# Patient Record
Sex: MALE | Race: WHITE | NOT HISPANIC OR LATINO | Employment: UNEMPLOYED | URBAN - NONMETROPOLITAN AREA
[De-identification: names, ages, dates, MRNs, and addresses within clinical notes are randomized per-mention and may not be internally consistent; named-entity substitution may affect disease eponyms.]

---

## 2017-02-18 ENCOUNTER — APPOINTMENT (OUTPATIENT)
Dept: GENERAL RADIOLOGY | Facility: HOSPITAL | Age: 27
End: 2017-02-18

## 2017-02-18 ENCOUNTER — HOSPITAL ENCOUNTER (EMERGENCY)
Facility: HOSPITAL | Age: 27
Discharge: HOME | End: 2017-02-18
Attending: EMERGENCY MEDICINE | Admitting: EMERGENCY MEDICINE

## 2017-02-18 VITALS
TEMPERATURE: 99.3 F | DIASTOLIC BLOOD PRESSURE: 102 MMHG | SYSTOLIC BLOOD PRESSURE: 144 MMHG | OXYGEN SATURATION: 97 % | HEIGHT: 68 IN | HEART RATE: 67 BPM | BODY MASS INDEX: 32.58 KG/M2 | RESPIRATION RATE: 20 BRPM | WEIGHT: 215 LBS

## 2017-02-18 DIAGNOSIS — M25.511 ARTHRALGIA OF SHOULDER REGION, RIGHT: ICD-10-CM

## 2017-02-18 DIAGNOSIS — M25.511 ACUTE PAIN OF RIGHT SHOULDER: Primary | ICD-10-CM

## 2017-02-18 PROCEDURE — 25010000002 KETOROLAC TROMETHAMINE PER 15 MG: Performed by: PHYSICIAN ASSISTANT

## 2017-02-18 PROCEDURE — 73030 X-RAY EXAM OF SHOULDER: CPT

## 2017-02-18 PROCEDURE — 99284 EMERGENCY DEPT VISIT MOD MDM: CPT

## 2017-02-18 PROCEDURE — 96372 THER/PROPH/DIAG INJ SC/IM: CPT

## 2017-02-18 PROCEDURE — 25010000002 ORPHENADRINE CITRATE PER 60 MG: Performed by: PHYSICIAN ASSISTANT

## 2017-02-18 RX ORDER — KETOROLAC TROMETHAMINE 30 MG/ML
30 INJECTION, SOLUTION INTRAMUSCULAR; INTRAVENOUS ONCE
Status: COMPLETED | OUTPATIENT
Start: 2017-02-18 | End: 2017-02-18

## 2017-02-18 RX ORDER — PROMETHAZINE HYDROCHLORIDE 25 MG/1
25 TABLET ORAL EVERY 6 HOURS PRN
Qty: 20 TABLET | Refills: 0 | Status: SHIPPED | OUTPATIENT
Start: 2017-02-18 | End: 2018-06-11

## 2017-02-18 RX ORDER — ORPHENADRINE CITRATE 100 MG/1
100 TABLET, EXTENDED RELEASE ORAL 2 TIMES DAILY
Qty: 14 TABLET | Refills: 0 | Status: SHIPPED | OUTPATIENT
Start: 2017-02-18 | End: 2018-06-11

## 2017-02-18 RX ORDER — NAPROXEN 500 MG/1
500 TABLET ORAL 2 TIMES DAILY PRN
Qty: 14 TABLET | Refills: 0 | Status: SHIPPED | OUTPATIENT
Start: 2017-02-18 | End: 2018-06-11

## 2017-02-18 RX ORDER — ONDANSETRON 4 MG/1
4 TABLET, FILM COATED ORAL ONCE
Status: DISCONTINUED | OUTPATIENT
Start: 2017-02-18 | End: 2017-02-19 | Stop reason: HOSPADM

## 2017-02-18 RX ORDER — ONDANSETRON 4 MG/1
4 TABLET, ORALLY DISINTEGRATING ORAL ONCE
Status: COMPLETED | OUTPATIENT
Start: 2017-02-18 | End: 2017-02-18

## 2017-02-18 RX ORDER — ORPHENADRINE CITRATE 30 MG/ML
60 INJECTION INTRAMUSCULAR; INTRAVENOUS ONCE
Status: COMPLETED | OUTPATIENT
Start: 2017-02-18 | End: 2017-02-18

## 2017-02-18 RX ADMIN — KETOROLAC TROMETHAMINE 30 MG: 30 INJECTION, SOLUTION INTRAMUSCULAR at 21:59

## 2017-02-18 RX ADMIN — ORPHENADRINE CITRATE 60 MG: 30 INJECTION INTRAMUSCULAR; INTRAVENOUS at 21:58

## 2017-02-18 RX ADMIN — ONDANSETRON 4 MG: 4 TABLET, ORALLY DISINTEGRATING ORAL at 23:13

## 2017-02-19 NOTE — DISCHARGE INSTRUCTIONS
Apply warm compresses every 3-4 hours for 20-30 minutes.  Follow-up with  on Monday for recheck.  Return if any change or worsening.

## 2017-02-19 NOTE — ED PROVIDER NOTES
Subjective   Patient is a 26 y.o. male presenting with upper extremity pain.   History provided by:  Patient  Upper Extremity Issue   Location:  Shoulder  Shoulder location:  R shoulder  Injury: yes (Overuse injury at work)    Time since incident:  2 days  Mechanism of injury comment:  Overuse injury  Pain details:     Quality:  Aching    Radiates to:  Does not radiate    Severity:  Severe    Onset quality:  Gradual    Duration:  2 days    Timing:  Constant  Handedness:  Right-handed  Dislocation: no    Foreign body present:  No foreign bodies  Tetanus status:  Unknown  Prior injury to area:  No  Relieved by:  Nothing  Worsened by:  Nothing  Ineffective treatments:  None tried  Associated symptoms: decreased range of motion and stiffness    Associated symptoms: no back pain, no fatigue, no fever, no muscle weakness, no neck pain, no numbness, no swelling and no tingling      26-year-old male presents complaining of increased right shoulder pain near acromioclavicular aspect superiorly for the past 2 days, has been worsening, unable to tolerate pain with OTC medications at home.  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  He denies paresis or paresthesia.  He has increased pain with abduction.  No shortness of breath.  No neck pain.  No direct trauma.  No prior history of shoulder injury.  No prior history of cervical disc disease.  Review of Systems   Constitutional: Negative for fatigue and fever.   Musculoskeletal: Positive for stiffness. Negative for back pain and neck pain.        Per history of present illness   All other systems reviewed and are negative.      Past Medical History   Diagnosis Date   • Injury of back        No Known Allergies    Past Surgical History   Procedure Laterality Date   • Back surgery     • Appendectomy     • Abdominal surgery     • Colon surgery         History reviewed. No pertinent family history.    Social History     Social History    • Marital status: Single     Spouse name: N/A   • Number of children: N/A   • Years of education: N/A     Social History Main Topics   • Smoking status: Former Smoker   • Smokeless tobacco: None   • Alcohol use No   • Drug use: None   • Sexual activity: Not Asked     Other Topics Concern   • None     Social History Narrative   • None           Objective   Physical Exam   Constitutional: He is oriented to person, place, and time. He appears well-developed and well-nourished. No distress.   HENT:   Head: Normocephalic and atraumatic.   Right Ear: External ear normal.   Left Ear: External ear normal.   Nose: Nose normal.   Mouth/Throat: Oropharynx is clear and moist. No oropharyngeal exudate.   Eyes: Conjunctivae and EOM are normal. Pupils are equal, round, and reactive to light. Right eye exhibits no discharge. Left eye exhibits no discharge. No scleral icterus.   Neck: Normal range of motion. Neck supple. No JVD present. No tracheal deviation present. No thyromegaly present.   Cardiovascular: Normal rate, regular rhythm, normal heart sounds and intact distal pulses.  Exam reveals no gallop and no friction rub.    No murmur heard.  Pulmonary/Chest: Effort normal and breath sounds normal. No stridor. No respiratory distress. He has no wheezes. He has no rales. He exhibits no tenderness.   Abdominal: Soft. He exhibits no distension.   Musculoskeletal: Normal range of motion. He exhibits tenderness. He exhibits no edema or deformity.   Right shoulder without obvious deformity no crepitus or step-off.  There is tenderness over the superior aspect of the shoulder and acromioclavicular joint, reproducing patient's symptoms. Able  to perform open-door maneuver with elbow helld at 90° flexion close into body.  He does have increased pain with abduction at about 70°.  Unable to perform empty cup test due to pain.  Pulses intact and symmetric neurovascular status intact distally.   Lymphadenopathy:     He has no cervical  "adenopathy.   Neurological: He is alert and oriented to person, place, and time. No cranial nerve deficit. He exhibits normal muscle tone. Coordination normal.   Skin: Skin is warm and dry. No rash noted. He is not diaphoretic. No erythema. No pallor.   Psychiatric: He has a normal mood and affect. His behavior is normal. Judgment and thought content normal.   Nursing note and vitals reviewed.      Procedures        No results found for this or any previous visit (from the past 24 hour(s)).  Note: In addition to lab results from this visit, the labs listed above may include labs taken at another facility or during a different encounter within the last 24 hours. Please correlate lab times with ED admission and discharge times for further clarification of the services performed during this visit.    XR Shoulder 2+ View Right   ED Interpretation   No acute findings        Vitals:    02/18/17 2035 02/18/17 2156 02/18/17 2348   BP: 141/95 (!) 140/102 (!) 144/102   BP Location:   Left arm   Patient Position:   Sitting   Pulse: 81 70 67   Resp: 18 20 20   Temp: 98.9 °F (37.2 °C)  99.3 °F (37.4 °C)   TempSrc: Oral  Oral   SpO2: 98% 97% 97%   Weight: 215 lb (97.5 kg)     Height: 68\" (172.7 cm)       Medications   ondansetron (ZOFRAN) tablet 4 mg (4 mg Oral Not Given 2/18/17 2357)   orphenadrine (NORFLEX) injection 60 mg (60 mg Intramuscular Given 2/18/17 2158)   ketorolac (TORADOL) injection 30 mg (30 mg Intramuscular Given 2/18/17 2159)   ondansetron ODT (ZOFRAN-ODT) disintegrating tablet 4 mg (4 mg Oral Given 2/18/17 2313)     ECG/EMG Results (last 24 hours)     ** No results found for the last 24 hours. **          ED Course  ED Course                  MDM    Final diagnoses:   Acute pain of right shoulder   Arthralgia of shoulder region, right            Carlos Venu Feliz PA-C  02/19/17 0121    "

## 2017-02-23 ENCOUNTER — OFFICE VISIT (OUTPATIENT)
Dept: FAMILY MEDICINE CLINIC | Facility: CLINIC | Age: 27
End: 2017-02-23

## 2017-02-23 VITALS
OXYGEN SATURATION: 100 % | WEIGHT: 204 LBS | RESPIRATION RATE: 16 BRPM | TEMPERATURE: 98.4 F | BODY MASS INDEX: 30.92 KG/M2 | SYSTOLIC BLOOD PRESSURE: 125 MMHG | HEART RATE: 73 BPM | DIASTOLIC BLOOD PRESSURE: 90 MMHG | HEIGHT: 68 IN

## 2017-02-23 DIAGNOSIS — IMO0001 TEAR OF RIGHT SUPRASPINATUS TENDON, INITIAL ENCOUNTER: Primary | ICD-10-CM

## 2017-02-23 PROCEDURE — 99214 OFFICE O/P EST MOD 30 MIN: CPT | Performed by: INTERNAL MEDICINE

## 2017-02-23 PROCEDURE — 96372 THER/PROPH/DIAG INJ SC/IM: CPT | Performed by: INTERNAL MEDICINE

## 2017-02-23 RX ORDER — METHYLPREDNISOLONE ACETATE 80 MG/ML
80 INJECTION, SUSPENSION INTRA-ARTICULAR; INTRALESIONAL; INTRAMUSCULAR; SOFT TISSUE ONCE
Status: COMPLETED | OUTPATIENT
Start: 2017-02-23 | End: 2017-02-23

## 2017-02-23 RX ORDER — MELOXICAM 15 MG/1
15 TABLET ORAL DAILY
Qty: 30 TABLET | Refills: 1 | Status: SHIPPED | OUTPATIENT
Start: 2017-02-23 | End: 2018-06-11

## 2017-02-23 RX ADMIN — METHYLPREDNISOLONE ACETATE 80 MG: 80 INJECTION, SUSPENSION INTRA-ARTICULAR; INTRALESIONAL; INTRAMUSCULAR; SOFT TISSUE at 12:41

## 2017-02-23 NOTE — PROGRESS NOTES
"Subjective   Patient ID: Ha Henson is a 26 y.o. male Pt is here for management of multiple medical problems.  History of Present Illness  Pt is here for management of multiple medical problems.    Pain in right shoulder.   1 week of pain. Seen in er. Told to wear sling and take ibu.  Not wearing sling. Taking naproxen 5oo mg BId.   Changes tires occ.  No know issues.  Just started to hurt at home and got worse.    Having chills and sweats.  Nausa.  Mostly pain.      The following portions of the patient's history were reviewed and updated as appropriate: allergies, current medications, past family history, past medical history, past social history, past surgical history and problem list.      Review of Systems   Constitutional: Positive for fatigue.   Musculoskeletal: Positive for arthralgias.   All other systems reviewed and are negative.      Objective     Visit Vitals   • /90   • Pulse 73   • Temp 98.4 °F (36.9 °C) (Oral)   • Resp 16   • Ht 68\" (172.7 cm)   • Wt 204 lb (92.5 kg)   • SpO2 100%   • BMI 31.02 kg/m2     Physical Exam  General Appearance:    Alert, cooperative, no distress, appears stated age   Head:    Normocephalic, without obvious abnormality, atraumatic   Eyes:    PERRL, conjunctiva/corneas clear, EOM's intact           Ears:    Normal TM's and external ear canals, both ears   Nose:   Nares normal, septum midline, mucosa normal, no drainage    or sinus tenderness   Throat:   Lips, mucosa, and tongue normal; teeth and gums normal   Neck:   Supple, symmetrical, trachea midline, no adenopathy;        thyroid:  No enlargement/tenderness/nodules; no carotid    bruit or JVD   Back:     Symmetric, no curvature, ROM normal, no CVA tenderness   Lungs:     Clear to auscultation bilaterally, respirations unlabored   Chest wall:    No tenderness or deformity   Heart:    Regular rate and rhythm, S1 and S2 normal, no murmur, rub   or gallop   Abdomen:     Soft, non-tender, bowel sounds active " all four quadrants,     no masses, no organomegaly           Extremities:  weakness and pain with thumb down abduction. + apprehension.  Most ttp interior shoulder.      Pulses:   2+ and symmetric all extremities   Skin:   Skin color, texture, turgor normal, no rashes or lesions   Lymph nodes:   Cervical, supraclavicular, and axillary nodes normal   Neurologic:   CNII-XII intact. Normal strength, sensation and reflexes       throughout       Assessment/Plan     Use sling day and night.  Steroids. Nsaids. Monitor bp.        Ha was seen today for establish care and pain.    Diagnoses and all orders for this visit:    Tear of right supraspinatus tendon, initial encounter  -     MRI Shoulder Right Without Contrast; Future  -     methylPREDNISolone acetate (DEPO-medrol) injection 80 mg; Inject 1 mL into the shoulder, thigh, or buttocks 1 (One) Time.  -     meloxicam (MOBIC) 15 MG tablet; Take 1 tablet by mouth Daily.    Return in about 1 week (around 3/2/2017).                   There are no Patient Instructions on file for this visit.

## 2018-05-30 ENCOUNTER — APPOINTMENT (OUTPATIENT)
Dept: GENERAL RADIOLOGY | Facility: HOSPITAL | Age: 28
End: 2018-05-30

## 2018-05-30 ENCOUNTER — HOSPITAL ENCOUNTER (EMERGENCY)
Facility: HOSPITAL | Age: 28
Discharge: HOME | End: 2018-05-30
Attending: EMERGENCY MEDICINE | Admitting: EMERGENCY MEDICINE

## 2018-05-30 VITALS
HEART RATE: 118 BPM | TEMPERATURE: 98.2 F | BODY MASS INDEX: 29.33 KG/M2 | DIASTOLIC BLOOD PRESSURE: 90 MMHG | WEIGHT: 198 LBS | RESPIRATION RATE: 20 BRPM | SYSTOLIC BLOOD PRESSURE: 137 MMHG | HEIGHT: 69 IN | OXYGEN SATURATION: 99 %

## 2018-05-30 DIAGNOSIS — S90.31XA CONTUSION OF RIGHT FOOT, INITIAL ENCOUNTER: Primary | ICD-10-CM

## 2018-05-30 DIAGNOSIS — S97.81XA CRUSHING INJURY OF RIGHT FOOT, INITIAL ENCOUNTER: ICD-10-CM

## 2018-05-30 PROCEDURE — 73630 X-RAY EXAM OF FOOT: CPT

## 2018-05-30 PROCEDURE — 99283 EMERGENCY DEPT VISIT LOW MDM: CPT

## 2018-05-30 RX ORDER — IBUPROFEN 800 MG/1
800 TABLET ORAL EVERY 8 HOURS PRN
Qty: 30 TABLET | Refills: 0 | Status: SHIPPED | OUTPATIENT
Start: 2018-05-30

## 2018-05-30 RX ORDER — HYDROCODONE BITARTRATE AND ACETAMINOPHEN 5; 325 MG/1; MG/1
1 TABLET ORAL ONCE
Status: COMPLETED | OUTPATIENT
Start: 2018-05-30 | End: 2018-05-30

## 2018-05-30 RX ADMIN — HYDROCODONE BITARTRATE AND ACETAMINOPHEN 1 TABLET: 5; 325 TABLET ORAL at 12:58

## 2018-06-02 ENCOUNTER — HOSPITAL ENCOUNTER (EMERGENCY)
Facility: HOSPITAL | Age: 28
Discharge: HOME | End: 2018-06-02
Attending: EMERGENCY MEDICINE | Admitting: EMERGENCY MEDICINE

## 2018-06-02 VITALS
OXYGEN SATURATION: 98 % | DIASTOLIC BLOOD PRESSURE: 93 MMHG | WEIGHT: 198 LBS | SYSTOLIC BLOOD PRESSURE: 131 MMHG | RESPIRATION RATE: 20 BRPM | HEIGHT: 68 IN | HEART RATE: 92 BPM | TEMPERATURE: 98.3 F | BODY MASS INDEX: 30.01 KG/M2

## 2018-06-02 DIAGNOSIS — M54.31 SCIATICA OF RIGHT SIDE: ICD-10-CM

## 2018-06-02 DIAGNOSIS — S39.012A STRAIN OF LUMBAR REGION, INITIAL ENCOUNTER: Primary | ICD-10-CM

## 2018-06-02 PROCEDURE — 99283 EMERGENCY DEPT VISIT LOW MDM: CPT

## 2018-06-02 PROCEDURE — 96372 THER/PROPH/DIAG INJ SC/IM: CPT

## 2018-06-02 PROCEDURE — 25010000002 KETOROLAC TROMETHAMINE PER 15 MG: Performed by: EMERGENCY MEDICINE

## 2018-06-02 RX ORDER — CYCLOBENZAPRINE HCL 5 MG
5 TABLET ORAL 3 TIMES DAILY PRN
Qty: 20 TABLET | Refills: 0 | Status: SHIPPED | OUTPATIENT
Start: 2018-06-02 | End: 2018-06-11

## 2018-06-02 RX ORDER — NAPROXEN 500 MG/1
500 TABLET ORAL 2 TIMES DAILY PRN
Qty: 20 TABLET | Refills: 0 | Status: SHIPPED | OUTPATIENT
Start: 2018-06-02 | End: 2018-06-11

## 2018-06-02 RX ORDER — HYDROCODONE BITARTRATE AND ACETAMINOPHEN 5; 325 MG/1; MG/1
1 TABLET ORAL ONCE
Status: COMPLETED | OUTPATIENT
Start: 2018-06-02 | End: 2018-06-02

## 2018-06-02 RX ORDER — PREDNISONE 20 MG/1
20 TABLET ORAL 3 TIMES DAILY
Qty: 15 TABLET | Refills: 0 | Status: SHIPPED | OUTPATIENT
Start: 2018-06-02 | End: 2018-06-11

## 2018-06-02 RX ORDER — KETOROLAC TROMETHAMINE 15 MG/ML
15 INJECTION, SOLUTION INTRAMUSCULAR; INTRAVENOUS ONCE
Status: COMPLETED | OUTPATIENT
Start: 2018-06-02 | End: 2018-06-02

## 2018-06-02 RX ADMIN — HYDROCODONE BITARTRATE AND ACETAMINOPHEN 1 TABLET: 5; 325 TABLET ORAL at 13:23

## 2018-06-02 RX ADMIN — KETOROLAC TROMETHAMINE 15 MG: 15 INJECTION, SOLUTION INTRAMUSCULAR; INTRAVENOUS at 13:26

## 2018-06-02 NOTE — ED PROVIDER NOTES
Subjective   28-year-old male presents complaining of right-sided low back pain.  He states that last night, he lifted a heavy tote and when he stood up he felt a pop in his low back.  Since that time, he has been experiencing pain and tightness in his right lumbar paraspinal region.  He endorses occasional shooting pains in his right buttocks and right thigh.  No bowel or bladder incontinence or retention.  No IV drug use.  No fevers or systemic symptoms.  The patient is ambulatory.  Pain is currently 8 out of 10 in severity and worse with movement, twisting, and walking.        Back Pain   Location:  Lumbar spine (Right)  Radiates to:  Does not radiate  Pain severity:  Moderate  Onset quality:  Sudden  Duration:  12 hours  Timing:  Constant  Progression:  Unchanged  Chronicity:  New  Context: lifting heavy objects (felt pop)    Relieved by:  Nothing  Ineffective treatments: Acetamenophin.  Associated symptoms: no bladder incontinence and no bowel incontinence        Review of Systems   Gastrointestinal: Negative for bowel incontinence.   Genitourinary: Negative for bladder incontinence.   Musculoskeletal: Positive for back pain.   All other systems reviewed and are negative.      Past Medical History:   Diagnosis Date   • Injury of back        No Known Allergies    Past Surgical History:   Procedure Laterality Date   • ABDOMINAL SURGERY     • APPENDECTOMY     • BACK SURGERY     • COLON SURGERY     • WISDOM TOOTH EXTRACTION         Family History   Problem Relation Age of Onset   • Family history unknown: Yes       Social History     Social History   • Marital status: Single     Social History Main Topics   • Smoking status: Never Smoker   • Smokeless tobacco: Never Used   • Alcohol use No   • Drug use: No     Other Topics Concern   • Not on file         Objective   Physical Exam   Constitutional: He is oriented to person, place, and time. He appears well-developed and well-nourished. No distress.   Well-appearing  male in no acute distress   HENT:   Head: Normocephalic and atraumatic.   Mouth/Throat: Oropharynx is clear and moist.   Cardiovascular: Normal rate, regular rhythm and normal heart sounds.  Exam reveals no gallop and no friction rub.    No murmur heard.  Pulmonary/Chest: Effort normal and breath sounds normal. No respiratory distress. He has no wheezes. He has no rales.   Musculoskeletal: Normal range of motion.   Mild tenderness noted to right lumbar paraspinal region, no midline tenderness, no step-offs or deformities present   Neurological: He is alert and oriented to person, place, and time. He displays normal reflexes. No sensory deficit. He exhibits normal muscle tone.   5 out of 5 strength in bilateral lower extremities, neurovascularly intact distally in bilateral extremities with bounding distal pulses and normal sensation, no saddle anesthesia, normal gait   Skin: Skin is warm and dry. No rash noted. He is not diaphoretic. No erythema. No pallor.   Psychiatric: He has a normal mood and affect. Judgment and thought content normal.   Nursing note and vitals reviewed.      Procedures         ED Course  ED Course as of Jun 02 1320   Sat Jun 02, 2018   1319 28-year-old male presents complaining of atraumatic right-sided low back pain since last night after heavy lifting.  On arrival to the ED, patient very well-appearing with benign exam.  No red flag low back pain signs, symptoms, or history necessitating any imaging at this time.  Reassured and counseled regarding symptomatic treatment.  Pain control provided in the ED.  Scripts for NSAIDs, Flexeril, and steroids.  He will follow-up with primary care physician within one week and was referred to neurosurgery if he fails conservative treatment.  Agreeable with plan and given appropriate return precautions.  [DD]      ED Course User Index  [DD] Rene Johnson MD               No results found for this or any previous visit (from the past 24  "hour(s)).  Note: In addition to lab results from this visit, the labs listed above may include labs taken at another facility or during a different encounter within the last 24 hours. Please correlate lab times with ED admission and discharge times for further clarification of the services performed during this visit.    No orders to display     Vitals:    06/02/18 1308   BP: 148/95   BP Location: Left arm   Patient Position: Sitting   Pulse: 92   Resp: 20   Temp: 98.3 °F (36.8 °C)   TempSrc: Oral   SpO2: 98%   Weight: 89.8 kg (198 lb)   Height: 172.7 cm (68\")     Medications   ketorolac (TORADOL) injection 15 mg (not administered)   HYDROcodone-acetaminophen (NORCO) 5-325 MG per tablet 1 tablet (not administered)     ECG/EMG Results (last 24 hours)     ** No results found for the last 24 hours. **            MDM    Final diagnoses:   Strain of lumbar region, initial encounter   Sciatica of right side       Documentation assistance provided by karin Alston.  Information recorded by the scribe was done at my direction and has been verified and validated by me.     Willis Alston  06/02/18 1316       Rene Johnson MD  06/02/18 1320       Rene Johnson MD  06/02/18 1323    "

## 2018-06-02 NOTE — DISCHARGE INSTRUCTIONS
Follow up with your primary care provider, Dr. Velasco, in 1 week.    Follow up with Dr. Macdonald, neurosurgeon, as needed.    Return to the ED if you experience any new or worsening symptoms.

## 2018-06-11 ENCOUNTER — OFFICE VISIT (OUTPATIENT)
Dept: INTERNAL MEDICINE | Facility: CLINIC | Age: 28
End: 2018-06-11

## 2018-06-11 ENCOUNTER — HOSPITAL ENCOUNTER (OUTPATIENT)
Dept: GENERAL RADIOLOGY | Facility: HOSPITAL | Age: 28
Discharge: HOME | End: 2018-06-11
Admitting: INTERNAL MEDICINE

## 2018-06-11 VITALS
DIASTOLIC BLOOD PRESSURE: 92 MMHG | HEIGHT: 68 IN | SYSTOLIC BLOOD PRESSURE: 141 MMHG | WEIGHT: 193 LBS | RESPIRATION RATE: 16 BRPM | HEART RATE: 102 BPM | OXYGEN SATURATION: 100 % | TEMPERATURE: 98.3 F | BODY MASS INDEX: 29.25 KG/M2

## 2018-06-11 DIAGNOSIS — I10 ESSENTIAL HYPERTENSION: ICD-10-CM

## 2018-06-11 DIAGNOSIS — R00.0 TACHYCARDIA: ICD-10-CM

## 2018-06-11 DIAGNOSIS — M54.16 LUMBAR BACK PAIN WITH RADICULOPATHY AFFECTING LEFT LOWER EXTREMITY: Primary | ICD-10-CM

## 2018-06-11 DIAGNOSIS — M54.16 LUMBAR BACK PAIN WITH RADICULOPATHY AFFECTING LEFT LOWER EXTREMITY: ICD-10-CM

## 2018-06-11 LAB
ALBUMIN SERPL-MCNC: 4.2 G/DL (ref 3.5–5)
ALBUMIN/GLOB SERPL: 1.3 G/DL (ref 1–2)
ALP SERPL-CCNC: 59 U/L (ref 38–126)
ALT SERPL-CCNC: 48 U/L (ref 13–69)
AST SERPL-CCNC: 36 U/L (ref 15–46)
BASOPHILS # BLD AUTO: 0.05 10*3/MM3 (ref 0–0.2)
BASOPHILS NFR BLD AUTO: 0.5 % (ref 0–2.5)
BILIRUB BLD-MCNC: NEGATIVE MG/DL
BILIRUB SERPL-MCNC: 0.1 MG/DL (ref 0.2–1.3)
BUN SERPL-MCNC: 17 MG/DL (ref 7–20)
BUN/CREAT SERPL: 18.9 (ref 6.3–21.9)
CALCIUM SERPL-MCNC: 9.7 MG/DL (ref 8.4–10.2)
CHLORIDE SERPL-SCNC: 104 MMOL/L (ref 98–107)
CLARITY, POC: CLEAR
CO2 SERPL-SCNC: 27 MMOL/L (ref 26–30)
COLOR UR: YELLOW
CREAT SERPL-MCNC: 0.9 MG/DL (ref 0.6–1.3)
EOSINOPHIL # BLD AUTO: 0.42 10*3/MM3 (ref 0–0.7)
EOSINOPHIL NFR BLD AUTO: 4.4 % (ref 0–7)
ERYTHROCYTE [DISTWIDTH] IN BLOOD BY AUTOMATED COUNT: 16.1 % (ref 11.5–14.5)
GFR SERPLBLD CREATININE-BSD FMLA CKD-EPI: 100 ML/MIN/1.73
GFR SERPLBLD CREATININE-BSD FMLA CKD-EPI: 121 ML/MIN/1.73
GLOBULIN SER CALC-MCNC: 3.2 GM/DL
GLUCOSE SERPL-MCNC: 92 MG/DL (ref 74–98)
GLUCOSE UR STRIP-MCNC: NEGATIVE MG/DL
HCT VFR BLD AUTO: 42.1 % (ref 42–52)
HGB BLD-MCNC: 13.4 G/DL (ref 14–18)
IMM GRANULOCYTES # BLD: 0.08 10*3/MM3 (ref 0–0.06)
IMM GRANULOCYTES NFR BLD: 0.8 % (ref 0–0.6)
KETONES UR QL: NEGATIVE
LEUKOCYTE EST, POC: NEGATIVE
LYMPHOCYTES # BLD AUTO: 3.1 10*3/MM3 (ref 0.6–3.4)
LYMPHOCYTES NFR BLD AUTO: 32.6 % (ref 10–50)
MCH RBC QN AUTO: 27.6 PG (ref 27–31)
MCHC RBC AUTO-ENTMCNC: 31.8 G/DL (ref 30–37)
MCV RBC AUTO: 86.8 FL (ref 80–94)
MONOCYTES # BLD AUTO: 0.77 10*3/MM3 (ref 0–0.9)
MONOCYTES NFR BLD AUTO: 8.1 % (ref 0–12)
NEUTROPHILS # BLD AUTO: 5.09 10*3/MM3 (ref 2–6.9)
NEUTROPHILS NFR BLD AUTO: 53.6 % (ref 37–80)
NITRITE UR-MCNC: NEGATIVE MG/ML
NRBC BLD AUTO-RTO: 0 /100 WBC (ref 0–0)
PH UR: 6 [PH] (ref 5–8)
PLATELET # BLD AUTO: 399 10*3/MM3 (ref 130–400)
POTASSIUM SERPL-SCNC: 5.1 MMOL/L (ref 3.5–5.1)
PROT SERPL-MCNC: 7.4 G/DL (ref 6.3–8.2)
PROT UR STRIP-MCNC: NEGATIVE MG/DL
RBC # BLD AUTO: 4.85 10*6/MM3 (ref 4.7–6.1)
RBC # UR STRIP: NEGATIVE /UL
SODIUM SERPL-SCNC: 140 MMOL/L (ref 137–145)
SP GR UR: 1.01 (ref 1–1.03)
T4 FREE SERPL-MCNC: 0.93 NG/DL (ref 0.78–2.19)
TSH SERPL DL<=0.005 MIU/L-ACNC: 2.89 MIU/ML (ref 0.47–4.68)
UROBILINOGEN UR QL: NORMAL
WBC # BLD AUTO: 9.51 10*3/MM3 (ref 4.8–10.8)

## 2018-06-11 PROCEDURE — 72110 X-RAY EXAM L-2 SPINE 4/>VWS: CPT

## 2018-06-11 PROCEDURE — 99214 OFFICE O/P EST MOD 30 MIN: CPT | Performed by: INTERNAL MEDICINE

## 2018-06-11 RX ORDER — ACETAMINOPHEN 500 MG
500 TABLET ORAL EVERY 6 HOURS PRN
COMMUNITY
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
## 2018-06-12 NOTE — PROGRESS NOTES
Please let them know the labs look good.  Osteophyte in lumbar spine. May need mri ifnot better with Dr Kumar.

## 2018-06-14 ENCOUNTER — HOSPITAL ENCOUNTER (OUTPATIENT)
Dept: MRI IMAGING | Facility: HOSPITAL | Age: 28
Discharge: HOME | End: 2018-06-14
Attending: INTERNAL MEDICINE | Admitting: INTERNAL MEDICINE

## 2018-06-14 DIAGNOSIS — M54.16 LUMBAR BACK PAIN WITH RADICULOPATHY AFFECTING LEFT LOWER EXTREMITY: ICD-10-CM

## 2018-06-14 PROCEDURE — 72148 MRI LUMBAR SPINE W/O DYE: CPT

## 2018-06-15 ENCOUNTER — TELEPHONE (OUTPATIENT)
Dept: INTERNAL MEDICINE | Facility: CLINIC | Age: 28
End: 2018-06-15

## 2018-06-18 ENCOUNTER — HOSPITAL ENCOUNTER (EMERGENCY)
Facility: HOSPITAL | Age: 28
Discharge: HOME | End: 2018-06-18
Attending: EMERGENCY MEDICINE | Admitting: EMERGENCY MEDICINE

## 2018-06-18 ENCOUNTER — OFFICE VISIT (OUTPATIENT)
Dept: INTERNAL MEDICINE | Facility: CLINIC | Age: 28
End: 2018-06-18

## 2018-06-18 VITALS
HEART RATE: 86 BPM | TEMPERATURE: 98.3 F | DIASTOLIC BLOOD PRESSURE: 89 MMHG | SYSTOLIC BLOOD PRESSURE: 148 MMHG | HEIGHT: 68 IN | RESPIRATION RATE: 16 BRPM | OXYGEN SATURATION: 100 % | WEIGHT: 188 LBS | BODY MASS INDEX: 28.49 KG/M2

## 2018-06-18 VITALS
WEIGHT: 188 LBS | SYSTOLIC BLOOD PRESSURE: 141 MMHG | TEMPERATURE: 98.7 F | BODY MASS INDEX: 26.92 KG/M2 | DIASTOLIC BLOOD PRESSURE: 89 MMHG | HEART RATE: 91 BPM | RESPIRATION RATE: 18 BRPM | HEIGHT: 70 IN | OXYGEN SATURATION: 100 %

## 2018-06-18 DIAGNOSIS — M54.50 LOW BACK PAIN RADIATING TO LEFT LOWER EXTREMITY: Primary | ICD-10-CM

## 2018-06-18 DIAGNOSIS — M51.36 BULGING LUMBAR DISC: Primary | ICD-10-CM

## 2018-06-18 DIAGNOSIS — M54.42 ACUTE LEFT-SIDED LOW BACK PAIN WITH LEFT-SIDED SCIATICA: ICD-10-CM

## 2018-06-18 DIAGNOSIS — M51.36 ANNULAR TEAR OF LUMBAR DISC: ICD-10-CM

## 2018-06-18 DIAGNOSIS — M79.605 LOW BACK PAIN RADIATING TO LEFT LOWER EXTREMITY: Primary | ICD-10-CM

## 2018-06-18 PROCEDURE — 99283 EMERGENCY DEPT VISIT LOW MDM: CPT

## 2018-06-18 PROCEDURE — 25010000002 MORPHINE PER 10 MG: Performed by: EMERGENCY MEDICINE

## 2018-06-18 PROCEDURE — 96375 TX/PRO/DX INJ NEW DRUG ADDON: CPT

## 2018-06-18 PROCEDURE — 25010000002 METHYLPREDNISOLONE PER 125 MG: Performed by: EMERGENCY MEDICINE

## 2018-06-18 PROCEDURE — 25010000002 KETOROLAC TROMETHAMINE PER 15 MG: Performed by: EMERGENCY MEDICINE

## 2018-06-18 PROCEDURE — 99213 OFFICE O/P EST LOW 20 MIN: CPT | Performed by: INTERNAL MEDICINE

## 2018-06-18 PROCEDURE — 96374 THER/PROPH/DIAG INJ IV PUSH: CPT

## 2018-06-18 RX ORDER — SODIUM CHLORIDE 0.9 % (FLUSH) 0.9 %
10 SYRINGE (ML) INJECTION AS NEEDED
Status: DISCONTINUED | OUTPATIENT
Start: 2018-06-18 | End: 2018-06-18 | Stop reason: HOSPADM

## 2018-06-18 RX ORDER — MORPHINE SULFATE 4 MG/ML
4 INJECTION, SOLUTION INTRAMUSCULAR; INTRAVENOUS ONCE
Status: COMPLETED | OUTPATIENT
Start: 2018-06-18 | End: 2018-06-18

## 2018-06-18 RX ORDER — METHYLPREDNISOLONE 4 MG/1
TABLET ORAL
Qty: 21 EACH | Refills: 0 | Status: SHIPPED | OUTPATIENT
Start: 2018-06-18

## 2018-06-18 RX ORDER — METHYLPREDNISOLONE SODIUM SUCCINATE 125 MG/2ML
125 INJECTION, POWDER, LYOPHILIZED, FOR SOLUTION INTRAMUSCULAR; INTRAVENOUS ONCE
Status: COMPLETED | OUTPATIENT
Start: 2018-06-18 | End: 2018-06-18

## 2018-06-18 RX ORDER — CYCLOBENZAPRINE HCL 10 MG
10 TABLET ORAL 3 TIMES DAILY PRN
Qty: 30 TABLET | Refills: 3 | Status: SHIPPED | OUTPATIENT
Start: 2018-06-18

## 2018-06-18 RX ORDER — KETOROLAC TROMETHAMINE 15 MG/ML
15 INJECTION, SOLUTION INTRAMUSCULAR; INTRAVENOUS ONCE
Status: COMPLETED | OUTPATIENT
Start: 2018-06-18 | End: 2018-06-18

## 2018-06-18 RX ORDER — TRAMADOL HYDROCHLORIDE 50 MG/1
50 TABLET ORAL EVERY 6 HOURS PRN
Qty: 12 TABLET | Refills: 0 | Status: SHIPPED | OUTPATIENT
Start: 2018-06-18

## 2018-06-18 RX ADMIN — METHYLPREDNISOLONE SODIUM SUCCINATE 125 MG: 125 INJECTION, POWDER, FOR SOLUTION INTRAMUSCULAR; INTRAVENOUS at 19:18

## 2018-06-18 RX ADMIN — MORPHINE SULFATE 4 MG: 4 INJECTION, SOLUTION INTRAMUSCULAR; INTRAVENOUS at 19:20

## 2018-06-18 RX ADMIN — KETOROLAC TROMETHAMINE 15 MG: 15 INJECTION, SOLUTION INTRAMUSCULAR; INTRAVENOUS at 19:16

## 2018-06-18 NOTE — ED PROVIDER NOTES
Subjective   Mr. Ha Henson is a 28 y.o. male who presents to the ED with c/o back pain. Per old records, pt visted PCP this morning for f/u regarding chronic back pain. During visit he was instructed to continue pain management with Flexeril and Medrol and to follow up in 3 months. Dr. Blankenship also recommended consult with Dr. Tineo and pt is in process of scheduling this appointment. Pt presents to ED now for intractable lumbar back pain. He has numbness and pain radiating down left leg. He denies bowel or bladder dysfunction. Flexeril and Medrol have provided no relief and pt states the pain is too severe to wait for next f/u with Dr. Blankenship. He has no other acute sx at this time. Pt has hx of chronic back pain for years gradually worsening and ADHD.           History provided by:  Patient  Back Pain   Location:  Lumbar spine  Radiates to:  L thigh  Pain severity:  Moderate  Pain is:  Same all the time  Timing:  Constant  Progression:  Unchanged  Chronicity:  Chronic  Relieved by:  Nothing  Worsened by:  Ambulation and sitting  Ineffective treatments: Flexeril and Medrol.  Associated symptoms: leg pain, numbness (left leg ) and tingling (Left leg )    Associated symptoms: no abdominal pain, no bladder incontinence, no bowel incontinence, no chest pain, no dysuria, no fever, no perianal numbness and no weakness        Review of Systems   Constitutional: Negative for fever.   Cardiovascular: Negative for chest pain.   Gastrointestinal: Negative for abdominal pain and bowel incontinence.   Genitourinary: Negative for bladder incontinence and dysuria.   Musculoskeletal: Positive for back pain (lumbar back).   Neurological: Positive for tingling (Left leg ) and numbness (left leg ). Negative for weakness.   All other systems reviewed and are negative.      Past Medical History:   Diagnosis Date   • ADHD    • Chronic back pain    • Injury of back        No Known Allergies    Past Surgical History:   Procedure  Laterality Date   • ABDOMINAL SURGERY     • APPENDECTOMY     • COLON SURGERY     • HIP FRACTURE SURGERY Left    • WISDOM TOOTH EXTRACTION         Family History   Problem Relation Age of Onset   • Family history unknown: Yes       Social History     Social History   • Marital status: Single     Social History Main Topics   • Smoking status: Never Smoker   • Smokeless tobacco: Never Used   • Alcohol use No   • Drug use: No   • Sexual activity: Defer     Other Topics Concern   • Not on file         Objective   Physical Exam   Constitutional: He is oriented to person, place, and time. He appears well-developed and well-nourished. No distress.   HENT:   Head: Normocephalic and atraumatic.   Right Ear: External ear normal.   Left Ear: External ear normal.   Nose: Nose normal.   Eyes: Conjunctivae are normal. No scleral icterus.   Neck: Normal range of motion. Neck supple.   Pulmonary/Chest: Effort normal. No respiratory distress.   Musculoskeletal: Normal range of motion. He exhibits no edema.        Lumbar back: He exhibits tenderness (midline and soft tissue tenderness, left greater than right. ).    Positive straight leg raise on the left.    Neurological: He is alert and oriented to person, place, and time.   Reflex Scores:       Patellar reflexes are 2+ on the right side and 2+ on the left side.  5/5 strength bilaterally in BLE with flexion and extension.    Skin: Skin is warm and dry.   Psychiatric: He has a normal mood and affect. His behavior is normal.   Nursing note and vitals reviewed.      Procedures         ED Course      Reviewed old records.   MRI LUMBAR SPINE     INDICATION: Low back pain and left lower extremity numbness. Patient  denied recent injury.     FINDINGS: Multisequence, multiplanar MR imaging of the lumbar spine  without contrast.     No compression deformity. No subluxation. No marrow edema or marrow  replacement. Conus has a normal appearance at level of termination.  There is disc  desiccation at L4-5 and L5-S1 with relative preservation  of disc space height.     Axial images were obtained through the following levels:     T12-L1: No canal or foraminal narrowing.     L1-2: No canal or foraminal narrowing.     L2-3: No canal or foraminal narrowing.     L3-4: No canal or foraminal narrowing.     L4-5: Circumferential disc bulge. Central broad-based disc protrusion  with associated central annular disc tear. Findings result in only  minimal central canal narrowing. No foraminal narrowing.     L5-S1: Circumferential disc bulge. There is also a broad-based left  paracentral and lateral disc protrusion with associated annular disc  tear. These findings result in minimal central canal narrowing  asymmetric towards the left towards the left lateral recess, approaching  the left S1 nerve but without definite posterior displacement. No  significant foraminal narrowing.     IMPRESSION:  Lower lumbar disc protrusions and annular disc tears as  detailed above.       ANDREW query complete. Treatment plan to include limited course of prescribed  controlled substance. Risks including addiction, benefits, and alternatives presented to patient.     Pt feeling better after treatment. He is already on Flexeril/Steroids and taking NSAIDs. Will Rx short course of Ultram for pain. He plans to f/u with Dr. Tineo with Neurosurgery.        No results found for this or any previous visit (from the past 24 hour(s)).  Note: In addition to lab results from this visit, the labs listed above may include labs taken at another facility or during a different encounter within the last 24 hours. Please correlate lab times with ED admission and discharge times for further clarification of the services performed during this visit.    No orders to display     Vitals:    06/18/18 1749   BP: 141/89   BP Location: Left arm   Patient Position: Sitting   Pulse: 91   Resp: 18   Temp: 98.7 °F (37.1 °C)   TempSrc: Oral   SpO2: 100%  "  Weight: 85.3 kg (188 lb)   Height: 177.8 cm (70\")     Medications   sodium chloride 0.9 % flush 10 mL (not administered)   methylPREDNISolone sodium succinate (SOLU-Medrol) injection 125 mg (125 mg Intravenous Given 6/18/18 1918)   ketorolac (TORADOL) injection 15 mg (15 mg Intravenous Given 6/18/18 1916)   Morphine sulfate (PF) injection 4 mg (4 mg Intravenous Given 6/18/18 1920)                          MDM    Final diagnoses:   Bulging lumbar disc   Annular tear of lumbar disc   Acute left-sided low back pain with left-sided sciatica       Documentation assistance provided by karin Pal.  Information recorded by the scribe was done at my direction and has been verified and validated by me.     Ayden Pal  06/18/18 1929       SISSY Hamm  06/18/18 5224    "

## 2018-06-18 NOTE — PROGRESS NOTES
"Subjective     Patient ID: Ha Henson is a 28 y.o. male. Patient is here for management of multiple medical problems.     Chief Complaint   Patient presents with   • Back Pain     follow-up, MRI results, patient having pain in the left leg x 3 days     History of Present Illness     Pt still in sig pain. Chiropractor not helping. Pt may be worse on the left side.    Tried dryer PT and no help.    Left lower back pain with radiculopathy in left leg.    Getting worse.        The following portions of the patient's history were reviewed and updated as appropriate: allergies, current medications, past family history, past medical history, past social history, past surgical history and problem list.    Review of Systems   Constitutional: Positive for fatigue.   Musculoskeletal: Positive for back pain and gait problem. Negative for neck pain.   Psychiatric/Behavioral: Negative for sleep disturbance.   All other systems reviewed and are negative.      Current Outpatient Prescriptions:   •  acetaminophen (TYLENOL) 500 MG tablet, Take 500 mg by mouth Every 6 (Six) Hours As Needed for Mild Pain ., Disp: , Rfl:   •  ibuprofen (ADVIL,MOTRIN) 800 MG tablet, Take 1 tablet by mouth Every 8 (Eight) Hours As Needed for Mild Pain  or Moderate Pain  for up to 30 doses., Disp: 30 tablet, Rfl: 0  •  cyclobenzaprine (FLEXERIL) 10 MG tablet, Take 1 tablet by mouth 3 (Three) Times a Day As Needed for Muscle Spasms., Disp: 30 tablet, Rfl: 3  •  MethylPREDNISolone (MEDROL, JAMIE,) 4 MG tablet, Take as directed on package instructions., Disp: 21 each, Rfl: 0    Objective      Blood pressure 148/89, pulse 86, temperature 98.3 °F (36.8 °C), temperature source Oral, resp. rate 16, height 172.7 cm (68\"), weight 85.3 kg (188 lb), SpO2 100 %.    Physical Exam     General Appearance:    Alert, cooperative, moderate distress, appears stated age   Head:    Normocephalic, without obvious abnormality, atraumatic   Eyes:    PERRL, " conjunctiva/corneas clear, EOM's intact   Ears:    Normal TM's and external ear canals, both ears   Nose:   Nares normal, septum midline, mucosa normal, no drainage   or sinus tenderness   Throat:   Lips, mucosa, and tongue normal; teeth and gums normal   Neck:   Supple, symmetrical, trachea midline, no adenopathy;        thyroid:  No enlargement/tenderness/nodules; no carotid    bruit or JVD   Back:     Symmetric, no curvature, ROM normal, no CVA tenderness   Lungs:     Clear to auscultation bilaterally, respirations unlabored   Chest wall:    No tenderness or deformity   Heart:    Regular rate and rhythm, S1 and S2 normal, no murmur,        rub or gallop   Abdomen:     Soft, non-tender, bowel sounds active all four quadrants,     no masses, no organomegaly   Extremities:   Limited rom.  + slr. + 3 dtr on left.       Pulses:   2+ and symmetric all extremities   Skin:   Skin color, texture, turgor normal, no rashes or lesions   Lymph nodes:   Cervical, supraclavicular, and axillary nodes normal   Neurologic:   CNII-XII intact. Normal strength, sensation and reflexes       throughout      Results for orders placed or performed in visit on 06/11/18   TSH   Result Value Ref Range    TSH 2.890 0.470 - 4.680 mIU/mL   T4, Free   Result Value Ref Range    Free T4 0.93 0.78 - 2.19 ng/dL   Comprehensive Metabolic Panel   Result Value Ref Range    Glucose 92 74 - 98 mg/dL    BUN 17 7 - 20 mg/dL    Creatinine 0.90 0.60 - 1.30 mg/dL    eGFR Non African Am 100 >60 mL/min/1.73    eGFR African Am 121 >60 mL/min/1.73    BUN/Creatinine Ratio 18.9 6.3 - 21.9    Sodium 140 137 - 145 mmol/L    Potassium 5.1 3.5 - 5.1 mmol/L    Chloride 104 98 - 107 mmol/L    Total CO2 27.0 26.0 - 30.0 mmol/L    Calcium 9.7 8.4 - 10.2 mg/dL    Total Protein 7.4 6.3 - 8.2 g/dL    Albumin 4.20 3.50 - 5.00 g/dL    Globulin 3.2 gm/dL    A/G Ratio 1.3 1.0 - 2.0 g/dL    Total Bilirubin 0.1 (L) 0.2 - 1.3 mg/dL    Alkaline Phosphatase 59 38 - 126 U/L    AST  (SGOT) 36 15 - 46 U/L    ALT (SGPT) 48 13 - 69 U/L   POC Urinalysis Dipstick, Automated   Result Value Ref Range    Color Yellow Yellow, Straw, Dark Yellow, Raya    Clarity, UA Clear Clear    Specific Gravity  1.015 1.005 - 1.030    pH, Urine 6.0 5.0 - 8.0    Leukocytes Negative Negative    Nitrite, UA Negative Negative    Protein, POC Negative Negative mg/dL    Glucose, UA Negative Negative, 1000 mg/dL (3+) mg/dL    Ketones, UA Negative Negative    Urobilinogen, UA Normal Normal    Bilirubin Negative Negative    Blood, UA Negative Negative   CBC & Differential   Result Value Ref Range    WBC 9.51 4.80 - 10.80 10*3/mm3    RBC 4.85 4.70 - 6.10 10*6/mm3    Hemoglobin 13.4 (L) 14.0 - 18.0 g/dL    Hematocrit 42.1 42.0 - 52.0 %    MCV 86.8 80.0 - 94.0 fL    MCH 27.6 27.0 - 31.0 pg    MCHC 31.8 30.0 - 37.0 g/dL    RDW 16.1 (H) 11.5 - 14.5 %    Platelets 399 130 - 400 10*3/mm3    Neutrophil Rel % 53.6 37.0 - 80.0 %    Lymphocyte Rel % 32.6 10.0 - 50.0 %    Monocyte Rel % 8.1 0.0 - 12.0 %    Eosinophil Rel % 4.4 0.0 - 7.0 %    Basophil Rel % 0.5 0.0 - 2.5 %    Neutrophils Absolute 5.09 2.00 - 6.90 10*3/mm3    Lymphocytes Absolute 3.10 0.60 - 3.40 10*3/mm3    Monocytes Absolute 0.77 0.00 - 0.90 10*3/mm3    Eosinophils Absolute 0.42 0.00 - 0.70 10*3/mm3    Basophils Absolute 0.05 0.00 - 0.20 10*3/mm3    Immature Granulocyte Rel % 0.8 (H) 0.0 - 0.6 %    Immature Grans Absolute 0.08 (H) 0.00 - 0.06 10*3/mm3    nRBC 0.0 0.0 - 0.0 /100 WBC         Assessment/Plan       Ha was seen today for back pain.    Diagnoses and all orders for this visit:    Low back pain radiating to left lower extremity  -     MethylPREDNISolone (MEDROL, JAMIE,) 4 MG tablet; Take as directed on package instructions.  -     cyclobenzaprine (FLEXERIL) 10 MG tablet; Take 1 tablet by mouth 3 (Three) Times a Day As Needed for Muscle Spasms.  -     Ambulatory Referral to Neurosurgery      Return in about 3 months (around 9/18/2018).          There are no  Patient Instructions on file for this visit.     Fei Blankenship MD    Assessment/Plan

## 2018-06-19 ENCOUNTER — TELEPHONE (OUTPATIENT)
Dept: INTERNAL MEDICINE | Facility: CLINIC | Age: 28
End: 2018-06-19

## 2018-06-19 NOTE — TELEPHONE ENCOUNTER
Dr. Tineo looked at the MRI and said that it wasn't urgent enough to see him so they have referred him out to Dr. Dixon.  Is that ok?  Please return call to pt.

## 2018-06-20 NOTE — TELEPHONE ENCOUNTER
[facility] CALLED ABOUT THE REFERRAL TO NEUROSURGERY. THEY ARE CURRENTLY WAITING FOR DR. NGUYEN'S  TO CALL THEM TO GET HIM SCHEDULE. SHE SAID HE IS STILL IN A LOT OF PAIN AND ARE WONDERING IF THERE IS SOMETHING THAT CAN BE DONE IN THE MEANTIME WHILE THEY WAIT FOR THE APPOINTMENT. SHE WOULD LIKE A CALL BACK.

## 2018-06-21 ENCOUNTER — OFFICE VISIT (OUTPATIENT)
Dept: INTERNAL MEDICINE | Facility: CLINIC | Age: 28
End: 2018-06-21

## 2018-06-21 VITALS
HEART RATE: 114 BPM | OXYGEN SATURATION: 100 % | DIASTOLIC BLOOD PRESSURE: 95 MMHG | HEIGHT: 68 IN | SYSTOLIC BLOOD PRESSURE: 148 MMHG | RESPIRATION RATE: 16 BRPM | WEIGHT: 197 LBS | TEMPERATURE: 98 F | BODY MASS INDEX: 29.86 KG/M2

## 2018-06-21 DIAGNOSIS — G57.02 PIRIFORMIS SYNDROME OF LEFT SIDE: Primary | ICD-10-CM

## 2018-06-21 DIAGNOSIS — M70.62 TROCHANTERIC BURSITIS OF LEFT HIP: ICD-10-CM

## 2018-06-21 DIAGNOSIS — M79.605 LOW BACK PAIN RADIATING TO LEFT LOWER EXTREMITY: ICD-10-CM

## 2018-06-21 DIAGNOSIS — M54.50 LOW BACK PAIN RADIATING TO LEFT LOWER EXTREMITY: ICD-10-CM

## 2018-06-21 PROCEDURE — 20610 DRAIN/INJ JOINT/BURSA W/O US: CPT | Performed by: INTERNAL MEDICINE

## 2018-06-21 PROCEDURE — 99214 OFFICE O/P EST MOD 30 MIN: CPT | Performed by: INTERNAL MEDICINE

## 2018-06-21 RX ORDER — TRIAMCINOLONE ACETONIDE 40 MG/ML
40 INJECTION, SUSPENSION INTRA-ARTICULAR; INTRAMUSCULAR ONCE
Status: COMPLETED | OUTPATIENT
Start: 2018-06-21 | End: 2018-06-21

## 2018-06-21 RX ADMIN — TRIAMCINOLONE ACETONIDE 40 MG: 40 INJECTION, SUSPENSION INTRA-ARTICULAR; INTRAMUSCULAR at 12:01

## 2018-06-21 NOTE — PATIENT INSTRUCTIONS
Piriformis Syndrome  Piriformis syndrome is a condition that can cause pain and numbness in your buttocks and down the back of your leg. Piriformis syndrome happens when the small muscle that connects the base of your spine to your hip (piriformis muscle) presses on the nerve that runs down the back of your leg (sciatic nerve).  The piriformis muscle helps your hip rotate and helps to bring your leg back and out. It also helps shift your weight while you are walking to keep you stable. The sciatic nerve runs under or through the piriformis. Damage to the piriformis muscle can cause spasms that put pressure on the nerve below. This causes pain and discomfort while sitting and moving. The pain may feel as if it begins in the buttock and spreads (radiates) down your hip and thigh.  What are the causes?  This condition is caused by pressure on the sciatic nerve from the piriformis muscle. The piriformis muscle can get irritated with overuse, especially if other hip muscles are weak and the piriformis has to do extra work. Piriformis syndrome can also occur after an injury, like a fall onto your buttocks.  What increases the risk?  This condition is more likely to develop in:  · Women.  · People who sit for long periods of time.  · Cyclists.  · People who have weak buttocks muscles (gluteal muscles).    What are the signs or symptoms?  Pain, tingling, or numbness that starts in the buttock and runs down the back of your leg (sciatica) is the most common symptom of this condition. Your symptoms may:  · Get worse the longer you sit.  · Get worse when you walk, run, or go up on stairs.    How is this diagnosed?  This condition is diagnosed based on your symptoms, medical history, and physical exam. During this exam, your health care provider may move your leg into different positions to check for pain. He or she will also press on the muscles of your hip and buttock to see if that increases your symptoms. You may also have  an X-ray or MRI.  How is this treated?  Treatment for this condition may include:  · Stopping all activities that cause pain or make your condition worse.  · Using heat or ice to relieve pain as told by your health care provider.  · Taking medicines to reduce pain and swelling.  · Taking a muscle relaxer to release the piriformis muscle.  · Doing range-of-motion and strengthening exercises (physical therapy) as told by your health care provider.  · Massaging the affected area.  · Getting an injection of an anti-inflammatory medicine or muscle relaxer to reduce inflammation and muscle tension.    In rare cases, you may need surgery to cut the muscle and release pressure on the nerve if other treatments do not work.  Follow these instructions at home:  · Take over-the-counter and prescription medicines only as told by your health care provider.  · Do not sit for long periods. Get up and walk around every 20 minutes or as often as told by your health care provider.  · If directed, apply heat to the affected area as often as told by your health care provider. Use the heat source that your health care provider recommends, such as a moist heat pack or a heating pad.  ? Place a towel between your skin and the heat source.  ? Leave the heat on for 20-30 minutes.  ? Remove the heat if your skin turns bright red. This is especially important if you are unable to feel pain, heat, or cold. You may have a greater risk of getting burned.  · If directed, apply ice to the injured area.  ? Put ice in a plastic bag.  ? Place a towel between your skin and the bag.  ? Leave the ice on for 20 minutes, 2-3 times a day.  · Do exercises as told by your health care provider.  · Return to your normal activities as told by your health care provider. Ask your health care provider what activities are safe for you.  · Keep all follow-up visits as told by your health care provider. This is important.  How is this prevented?  · Do not sit for  longer than 20 minutes at a time. When you sit, choose padded surfaces.  · Warm up and stretch before being active.  · Cool down and stretch after being active.  · Give your body time to rest between periods of activity.  · Make sure to use equipment that fits you.  · Maintain physical fitness, including:  ? Strength.  ? Flexibility.  Contact a health care provider if:  · Your pain and stiffness continue or get worse.  · Your leg or hip becomes weak.  · You have changes in your bowel function or bladder function.  This information is not intended to replace advice given to you by your health care provider. Make sure you discuss any questions you have with your health care provider.  Document Released: 12/18/2006 Document Revised: 08/22/2017 Document Reviewed: 11/29/2016  [employer] Interactive Patient Education © 2018 [employer]

## 2018-06-21 NOTE — PROGRESS NOTES
Subjective     Patient ID: Ha Henson is a 28 y.o. male. Patient is here for management of multiple medical problems.     Chief Complaint   Patient presents with   • Back Pain     patient still having increased pain, patient unable to see Dr. Mcclendon until  7/24/18      History of Present Illness   Pt with sig worsening of pain.   Chiropractor not helping.  Had medrol dose pack and flexeril. Alternating with IBU and tylenol.        The following portions of the patient's history were reviewed and updated as appropriate: allergies, current medications, past family history, past medical history, past social history, past surgical history and problem list.    Review of Systems   Constitutional: Positive for fatigue.   Musculoskeletal: Positive for arthralgias, back pain, gait problem and joint swelling.   Psychiatric/Behavioral: Positive for sleep disturbance.   All other systems reviewed and are negative.      Current Outpatient Prescriptions:   •  acetaminophen (TYLENOL) 500 MG tablet, Take 500 mg by mouth Every 6 (Six) Hours As Needed for Mild Pain ., Disp: , Rfl:   •  cyclobenzaprine (FLEXERIL) 10 MG tablet, Take 1 tablet by mouth 3 (Three) Times a Day As Needed for Muscle Spasms., Disp: 30 tablet, Rfl: 3  •  ibuprofen (ADVIL,MOTRIN) 800 MG tablet, Take 1 tablet by mouth Every 8 (Eight) Hours As Needed for Mild Pain  or Moderate Pain  for up to 30 doses., Disp: 30 tablet, Rfl: 0  •  MethylPREDNISolone (MEDROL, JAMIE,) 4 MG tablet, Take as directed on package instructions., Disp: 21 each, Rfl: 0  •  traMADol (ULTRAM) 50 MG tablet, Take 1 tablet by mouth Every 6 (Six) Hours As Needed for Moderate Pain  for up to 12 doses., Disp: 12 tablet, Rfl: 0    Current Facility-Administered Medications:   •  triamcinolone acetonide (KENALOG-40) injection 40 mg, 40 mg, Intra-articular, Once, Fei Blankenship MD    Objective      Blood pressure 148/95, pulse 114, temperature 98 °F (36.7 °C), temperature source Oral, resp.  "rate 16, height 172.7 cm (68\"), weight 89.4 kg (197 lb), SpO2 100 %.    Physical Exam     General Appearance:    Alert, cooperative, no distress, appears stated age   Head:    Normocephalic, without obvious abnormality, atraumatic   Eyes:    PERRL, conjunctiva/corneas clear, EOM's intact   Ears:    Normal TM's and external ear canals, both ears   Nose:   Nares normal, septum midline, mucosa normal, no drainage   or sinus tenderness   Throat:   Lips, mucosa, and tongue normal; teeth and gums normal   Neck:   Supple, symmetrical, trachea midline, no adenopathy;        thyroid:  No enlargement/tenderness/nodules; no carotid    bruit or JVD   Back:     Symmetric, no curvature, ROM normal, no CVA tenderness   Lungs:     Clear to auscultation bilaterally, respirations unlabored   Chest wall:    No tenderness or deformity   Heart:    Regular rate and rhythm, S1 and S2 normal, no murmur,        rub or gallop   Abdomen:     Soft, non-tender, bowel sounds active all four quadrants,     no masses, no organomegaly   Extremities:  ttp left troch and over piriformis muscle.     Pulses:   2+ and symmetric all extremities   Skin:   Skin color, texture, turgor normal, no rashes or lesions   Lymph nodes:   Cervical, supraclavicular, and axillary nodes normal   Neurologic:   CNII-XII intact. Normal strength, sensation and reflexes       throughout      Results for orders placed or performed in visit on 06/11/18   TSH   Result Value Ref Range    TSH 2.890 0.470 - 4.680 mIU/mL   T4, Free   Result Value Ref Range    Free T4 0.93 0.78 - 2.19 ng/dL   Comprehensive Metabolic Panel   Result Value Ref Range    Glucose 92 74 - 98 mg/dL    BUN 17 7 - 20 mg/dL    Creatinine 0.90 0.60 - 1.30 mg/dL    eGFR Non African Am 100 >60 mL/min/1.73    eGFR African Am 121 >60 mL/min/1.73    BUN/Creatinine Ratio 18.9 6.3 - 21.9    Sodium 140 137 - 145 mmol/L    Potassium 5.1 3.5 - 5.1 mmol/L    Chloride 104 98 - 107 mmol/L    Total CO2 27.0 26.0 - 30.0 " mmol/L    Calcium 9.7 8.4 - 10.2 mg/dL    Total Protein 7.4 6.3 - 8.2 g/dL    Albumin 4.20 3.50 - 5.00 g/dL    Globulin 3.2 gm/dL    A/G Ratio 1.3 1.0 - 2.0 g/dL    Total Bilirubin 0.1 (L) 0.2 - 1.3 mg/dL    Alkaline Phosphatase 59 38 - 126 U/L    AST (SGOT) 36 15 - 46 U/L    ALT (SGPT) 48 13 - 69 U/L   POC Urinalysis Dipstick, Automated   Result Value Ref Range    Color Yellow Yellow, Straw, Dark Yellow, Raya    Clarity, UA Clear Clear    Specific Gravity  1.015 1.005 - 1.030    pH, Urine 6.0 5.0 - 8.0    Leukocytes Negative Negative    Nitrite, UA Negative Negative    Protein, POC Negative Negative mg/dL    Glucose, UA Negative Negative, 1000 mg/dL (3+) mg/dL    Ketones, UA Negative Negative    Urobilinogen, UA Normal Normal    Bilirubin Negative Negative    Blood, UA Negative Negative   CBC & Differential   Result Value Ref Range    WBC 9.51 4.80 - 10.80 10*3/mm3    RBC 4.85 4.70 - 6.10 10*6/mm3    Hemoglobin 13.4 (L) 14.0 - 18.0 g/dL    Hematocrit 42.1 42.0 - 52.0 %    MCV 86.8 80.0 - 94.0 fL    MCH 27.6 27.0 - 31.0 pg    MCHC 31.8 30.0 - 37.0 g/dL    RDW 16.1 (H) 11.5 - 14.5 %    Platelets 399 130 - 400 10*3/mm3    Neutrophil Rel % 53.6 37.0 - 80.0 %    Lymphocyte Rel % 32.6 10.0 - 50.0 %    Monocyte Rel % 8.1 0.0 - 12.0 %    Eosinophil Rel % 4.4 0.0 - 7.0 %    Basophil Rel % 0.5 0.0 - 2.5 %    Neutrophils Absolute 5.09 2.00 - 6.90 10*3/mm3    Lymphocytes Absolute 3.10 0.60 - 3.40 10*3/mm3    Monocytes Absolute 0.77 0.00 - 0.90 10*3/mm3    Eosinophils Absolute 0.42 0.00 - 0.70 10*3/mm3    Basophils Absolute 0.05 0.00 - 0.20 10*3/mm3    Immature Granulocyte Rel % 0.8 (H) 0.0 - 0.6 %    Immature Grans Absolute 0.08 (H) 0.00 - 0.06 10*3/mm3    nRBC 0.0 0.0 - 0.0 /100 WBC         Assessment/Plan   Greater Trochanteric bursa injection  Indication: pain.   Patient consented.  Area prepped. Under sterile conditions left greater trochanter was injected with Lidocaine 1 cc 1% and kenalog 40 mg 1 cc. Patient tolerated  procedure well.       Ha was seen today for back pain.    Diagnoses and all orders for this visit:    Piriformis syndrome of left side  -     Ambulatory Referral to Physical Therapy Evaluate and treat    Low back pain radiating to left lower extremity    Trochanteric bursitis of left hip  -     triamcinolone acetonide (KENALOG-40) injection 40 mg; Inject 1 mL into the joint 1 (One) Time.      No Follow-up on file.          Patient Instructions   Piriformis Syndrome  Piriformis syndrome is a condition that can cause pain and numbness in your buttocks and down the back of your leg. Piriformis syndrome happens when the small muscle that connects the base of your spine to your hip (piriformis muscle) presses on the nerve that runs down the back of your leg (sciatic nerve).  The piriformis muscle helps your hip rotate and helps to bring your leg back and out. It also helps shift your weight while you are walking to keep you stable. The sciatic nerve runs under or through the piriformis. Damage to the piriformis muscle can cause spasms that put pressure on the nerve below. This causes pain and discomfort while sitting and moving. The pain may feel as if it begins in the buttock and spreads (radiates) down your hip and thigh.  What are the causes?  This condition is caused by pressure on the sciatic nerve from the piriformis muscle. The piriformis muscle can get irritated with overuse, especially if other hip muscles are weak and the piriformis has to do extra work. Piriformis syndrome can also occur after an injury, like a fall onto your buttocks.  What increases the risk?  This condition is more likely to develop in:  · Women.  · People who sit for long periods of time.  · Cyclists.  · People who have weak buttocks muscles (gluteal muscles).    What are the signs or symptoms?  Pain, tingling, or numbness that starts in the buttock and runs down the back of your leg (sciatica) is the most common symptom of this  condition. Your symptoms may:  · Get worse the longer you sit.  · Get worse when you walk, run, or go up on stairs.    How is this diagnosed?  This condition is diagnosed based on your symptoms, medical history, and physical exam. During this exam, your health care provider may move your leg into different positions to check for pain. He or she will also press on the muscles of your hip and buttock to see if that increases your symptoms. You may also have an X-ray or MRI.  How is this treated?  Treatment for this condition may include:  · Stopping all activities that cause pain or make your condition worse.  · Using heat or ice to relieve pain as told by your health care provider.  · Taking medicines to reduce pain and swelling.  · Taking a muscle relaxer to release the piriformis muscle.  · Doing range-of-motion and strengthening exercises (physical therapy) as told by your health care provider.  · Massaging the affected area.  · Getting an injection of an anti-inflammatory medicine or muscle relaxer to reduce inflammation and muscle tension.    In rare cases, you may need surgery to cut the muscle and release pressure on the nerve if other treatments do not work.  Follow these instructions at home:  · Take over-the-counter and prescription medicines only as told by your health care provider.  · Do not sit for long periods. Get up and walk around every 20 minutes or as often as told by your health care provider.  · If directed, apply heat to the affected area as often as told by your health care provider. Use the heat source that your health care provider recommends, such as a moist heat pack or a heating pad.  ? Place a towel between your skin and the heat source.  ? Leave the heat on for 20-30 minutes.  ? Remove the heat if your skin turns bright red. This is especially important if you are unable to feel pain, heat, or cold. You may have a greater risk of getting burned.  · If directed, apply ice to the injured  area.  ? Put ice in a plastic bag.  ? Place a towel between your skin and the bag.  ? Leave the ice on for 20 minutes, 2-3 times a day.  · Do exercises as told by your health care provider.  · Return to your normal activities as told by your health care provider. Ask your health care provider what activities are safe for you.  · Keep all follow-up visits as told by your health care provider. This is important.  How is this prevented?  · Do not sit for longer than 20 minutes at a time. When you sit, choose padded surfaces.  · Warm up and stretch before being active.  · Cool down and stretch after being active.  · Give your body time to rest between periods of activity.  · Make sure to use equipment that fits you.  · Maintain physical fitness, including:  ? Strength.  ? Flexibility.  Contact a health care provider if:  · Your pain and stiffness continue or get worse.  · Your leg or hip becomes weak.  · You have changes in your bowel function or bladder function.  This information is not intended to replace advice given to you by your health care provider. Make sure you discuss any questions you have with your health care provider.  Document Released: 12/18/2006 Document Revised: 08/22/2017 Document Reviewed: 11/29/2016  [employer] Patient Education © 2018 [employer]         Fei Blankenship MD    Assessment/Plan